# Patient Record
Sex: FEMALE | Race: WHITE | NOT HISPANIC OR LATINO | Employment: FULL TIME | URBAN - METROPOLITAN AREA
[De-identification: names, ages, dates, MRNs, and addresses within clinical notes are randomized per-mention and may not be internally consistent; named-entity substitution may affect disease eponyms.]

---

## 2017-02-10 ENCOUNTER — TELEPHONE (OUTPATIENT)
Dept: INTERNAL MEDICINE | Facility: CLINIC | Age: 42
End: 2017-02-10

## 2017-02-11 NOTE — TELEPHONE ENCOUNTER
----- Message from Navjot Pacheco sent at 2/10/2017  3:41 PM CST -----  Contact: pt  Pt is calling nurse staff regarding is on thyroid medication and pt stated that patient is feeling tired lately. Pt call back [phone] thanks

## 2017-02-13 ENCOUNTER — TELEPHONE (OUTPATIENT)
Dept: INTERNAL MEDICINE | Facility: CLINIC | Age: 42
End: 2017-02-13

## 2017-02-13 NOTE — TELEPHONE ENCOUNTER
----- Message from Alexandra Gu sent at 2/13/2017 12:36 PM CST -----  Pt at [phone]//states she is returning your call from Friday//please call again//thanks/rosa elena

## 2017-02-15 ENCOUNTER — TELEPHONE (OUTPATIENT)
Dept: INTERNAL MEDICINE | Facility: CLINIC | Age: 42
End: 2017-02-15

## 2017-02-15 DIAGNOSIS — E06.3 HASHIMOTO'S THYROIDITIS: ICD-10-CM

## 2017-02-15 DIAGNOSIS — R53.83 FATIGUE, UNSPECIFIED TYPE: ICD-10-CM

## 2017-02-15 DIAGNOSIS — E07.9 THYROID DISORDER: Primary | ICD-10-CM

## 2017-02-15 NOTE — TELEPHONE ENCOUNTER
Patient states that she is on 50mg of thyroid meds and she is tired all the time and fatigue. Patient last seen in aug of 2016 repeated labs and did us results showed hasimotos thyroiditis and you wanted to see her back to review results which she did not f/u. She states that she would like to know if the dose needs to be adjusted. Per last result note you put for her to return in April with lab please advise.

## 2017-02-15 NOTE — TELEPHONE ENCOUNTER
----- Message from Caty Zayas sent at 2/15/2017  2:52 PM CST -----  Would like to speak to nurse. Please call back at [phone]. Thanks///db

## 2017-02-16 NOTE — TELEPHONE ENCOUNTER
I ordered thyroid labs as well as blood counts, vit d and b12 and iron to look into the fatigue as well.

## 2017-03-01 ENCOUNTER — LAB VISIT (OUTPATIENT)
Dept: LAB | Facility: HOSPITAL | Age: 42
End: 2017-03-01
Attending: FAMILY MEDICINE
Payer: COMMERCIAL

## 2017-03-01 DIAGNOSIS — R53.83 FATIGUE, UNSPECIFIED TYPE: ICD-10-CM

## 2017-03-01 DIAGNOSIS — E07.9 THYROID DISORDER: ICD-10-CM

## 2017-03-01 DIAGNOSIS — E06.3 HASHIMOTO'S THYROIDITIS: ICD-10-CM

## 2017-03-01 LAB
25(OH)D3+25(OH)D2 SERPL-MCNC: 37 NG/ML
BASOPHILS # BLD AUTO: 0.04 K/UL
BASOPHILS NFR BLD: 0.9 %
DIFFERENTIAL METHOD: ABNORMAL
EOSINOPHIL # BLD AUTO: 0.1 K/UL
EOSINOPHIL NFR BLD: 2.5 %
ERYTHROCYTE [DISTWIDTH] IN BLOOD BY AUTOMATED COUNT: 14.6 %
HCT VFR BLD AUTO: 34.5 %
HGB BLD-MCNC: 10.5 G/DL
IRON SERPL-MCNC: 32 UG/DL
LYMPHOCYTES # BLD AUTO: 1.2 K/UL
LYMPHOCYTES NFR BLD: 26.3 %
MCH RBC QN AUTO: 25.3 PG
MCHC RBC AUTO-ENTMCNC: 30.4 %
MCV RBC AUTO: 83 FL
MONOCYTES # BLD AUTO: 0.4 K/UL
MONOCYTES NFR BLD: 8.2 %
NEUTROPHILS # BLD AUTO: 2.7 K/UL
NEUTROPHILS NFR BLD: 62.1 %
PLATELET # BLD AUTO: 308 K/UL
PMV BLD AUTO: 10 FL
RBC # BLD AUTO: 4.15 M/UL
SATURATED IRON: 8 %
T4 FREE SERPL-MCNC: 0.88 NG/DL
TOTAL IRON BINDING CAPACITY: 423 UG/DL
TRANSFERRIN SERPL-MCNC: 286 MG/DL
TSH SERPL DL<=0.005 MIU/L-ACNC: 4.7 UIU/ML
VIT B12 SERPL-MCNC: 472 PG/ML
WBC # BLD AUTO: 4.38 K/UL

## 2017-03-01 PROCEDURE — 36415 COLL VENOUS BLD VENIPUNCTURE: CPT | Mod: PO

## 2017-03-01 PROCEDURE — 84443 ASSAY THYROID STIM HORMONE: CPT

## 2017-03-01 PROCEDURE — 85025 COMPLETE CBC W/AUTO DIFF WBC: CPT

## 2017-03-01 PROCEDURE — 82607 VITAMIN B-12: CPT

## 2017-03-01 PROCEDURE — 82306 VITAMIN D 25 HYDROXY: CPT

## 2017-03-01 PROCEDURE — 83540 ASSAY OF IRON: CPT

## 2017-03-01 PROCEDURE — 84439 ASSAY OF FREE THYROXINE: CPT

## 2017-03-06 ENCOUNTER — PATIENT MESSAGE (OUTPATIENT)
Dept: INTERNAL MEDICINE | Facility: CLINIC | Age: 42
End: 2017-03-06

## 2017-03-07 ENCOUNTER — TELEPHONE (OUTPATIENT)
Dept: INTERNAL MEDICINE | Facility: CLINIC | Age: 42
End: 2017-03-07

## 2017-03-07 RX ORDER — LEVOTHYROXINE SODIUM 75 UG/1
75 TABLET ORAL DAILY
Qty: 90 TABLET | Refills: 0 | Status: SHIPPED | OUTPATIENT
Start: 2017-03-07 | End: 2017-06-09 | Stop reason: SDUPTHER

## 2017-03-07 NOTE — TELEPHONE ENCOUNTER
----- Message from Navjot Pacheco sent at 3/6/2017  4:09 PM CST -----  Contact: pt  Pt is calling nurse staff regarding patient lab results. Pt call back to be advise. [phone] to be advise thanks

## 2017-03-07 NOTE — TELEPHONE ENCOUNTER
She needs an increase in thyroid meds, but I thought she was going to have an appt to followup these labs. Please make an appt for next week. Will increase the thyroid from 50 to 75mcg

## 2017-03-09 ENCOUNTER — PATIENT OUTREACH (OUTPATIENT)
Dept: ADMINISTRATIVE | Facility: HOSPITAL | Age: 42
End: 2017-03-09

## 2017-03-20 ENCOUNTER — PATIENT MESSAGE (OUTPATIENT)
Dept: INTERNAL MEDICINE | Facility: CLINIC | Age: 42
End: 2017-03-20

## 2017-03-21 ENCOUNTER — OFFICE VISIT (OUTPATIENT)
Dept: INTERNAL MEDICINE | Facility: CLINIC | Age: 42
End: 2017-03-21
Payer: COMMERCIAL

## 2017-03-21 VITALS
HEART RATE: 80 BPM | SYSTOLIC BLOOD PRESSURE: 100 MMHG | TEMPERATURE: 98 F | HEIGHT: 60 IN | DIASTOLIC BLOOD PRESSURE: 80 MMHG | BODY MASS INDEX: 25.19 KG/M2 | WEIGHT: 128.31 LBS

## 2017-03-21 DIAGNOSIS — K59.00 CONSTIPATION, UNSPECIFIED CONSTIPATION TYPE: ICD-10-CM

## 2017-03-21 DIAGNOSIS — E06.3 HASHIMOTO'S THYROIDITIS: Primary | ICD-10-CM

## 2017-03-21 DIAGNOSIS — R14.0 ABDOMINAL BLOATING: ICD-10-CM

## 2017-03-21 DIAGNOSIS — R00.2 HEART PALPITATIONS: ICD-10-CM

## 2017-03-21 DIAGNOSIS — F41.1 GAD (GENERALIZED ANXIETY DISORDER): ICD-10-CM

## 2017-03-21 DIAGNOSIS — D64.9 ANEMIA, UNSPECIFIED TYPE: ICD-10-CM

## 2017-03-21 PROCEDURE — 1160F RVW MEDS BY RX/DR IN RCRD: CPT | Mod: S$GLB,,, | Performed by: FAMILY MEDICINE

## 2017-03-21 PROCEDURE — 93005 ELECTROCARDIOGRAM TRACING: CPT | Mod: S$GLB,,, | Performed by: FAMILY MEDICINE

## 2017-03-21 PROCEDURE — 99214 OFFICE O/P EST MOD 30 MIN: CPT | Mod: S$GLB,,, | Performed by: FAMILY MEDICINE

## 2017-03-21 PROCEDURE — 93010 ELECTROCARDIOGRAM REPORT: CPT | Mod: S$GLB,,, | Performed by: INTERNAL MEDICINE

## 2017-03-21 PROCEDURE — 99999 PR PBB SHADOW E&M-EST. PATIENT-LVL III: CPT | Mod: PBBFAC,,, | Performed by: FAMILY MEDICINE

## 2017-03-21 RX ORDER — SERTRALINE HYDROCHLORIDE 50 MG/1
50 TABLET, FILM COATED ORAL DAILY
Qty: 30 TABLET | Refills: 11 | Status: SHIPPED | OUTPATIENT
Start: 2017-03-21 | End: 2018-03-21

## 2017-03-21 NOTE — MR AVS SNAPSHOT
[facility]  [address] [place] [address]  Phone: [phone]  Fax: [phone]                  Sandy Alfonso   3/21/2017 8:40 AM   Office Visit    Description:  Female : 1975   Provider:  Jolie Almodovar MD   [facility]           Reason for Visit     Follow-up     Constipation           Diagnoses this Visit        Comments    Hashimoto's thyroiditis    -  Primary     Constipation, unspecified constipation type         Abdominal bloating         Anemia, unspecified type         KAMAR (generalized anxiety disorder)     worse lately with higher heart rate while working out. also with IBS constipatin. symptoms. start sertraline.     Heart palpitations                To Do List           Future Appointments        Provider Department Dept Phone    2017 9:10 AM LABORATORY, [place] [facility] [phone]    2017 11:00 AM Jolie Almodovar MD [facility] [phone]      Goals (5 Years of Data)     None      Follow-Up and Disposition     Return in about 5 weeks (around 2017) for f/u sertraline, thyroid, bowels, labs.       These Medications        Disp Refills Start End    sertraline (ZOLOFT) 50 MG tablet 30 tablet 11 3/21/2017 3/21/2018    Take 1 tablet (50 mg total) by mouth once daily. - Oral    Pharmacy: [facility] [address] [place], [place] [id number] [facility] [address] AT [facility] [place] [address] [place] [address] Ph #: [phone]         [facility]     [facility] On [facility] Nurse Care Line -  Assistance  Registered nurses in the [facility] Center provide clinical advisement, health education, appointment booking, and other advisory services.  Call for this free service at [phone].             Medications           Message regarding Medications     Verify the changes and/or additions to your medication regime listed below are the same as discussed with your clinician  today.  If any of these changes or additions are incorrect, please notify your healthcare provider.        START taking these NEW medications        Refills    sertraline (ZOLOFT) 50 MG tablet 11    Sig: Take 1 tablet (50 mg total) by mouth once daily.    Class: Normal    Route: Oral      STOP taking these medications     lubiprostone (AMITIZA) 8 MCG Cap Take 1 capsule (8 mcg total) by mouth 2 (two) times daily with meals.           Verify that the below list of medications is an accurate representation of the medications you are currently taking.  If none reported, the list may be blank. If incorrect, please contact your healthcare provider. Carry this list with you in case of emergency.           Current Medications     levothyroxine (SYNTHROID) 75 MCG tablet Take 1 tablet (75 mcg total) by mouth once daily.    sertraline (ZOLOFT) 50 MG tablet Take 1 tablet (50 mg total) by mouth once daily.    valacyclovir (VALTREX) 1000 MG tablet TK 2 TS PO TONIGHT AND 2 TS PO TOMORROW MORNING           Clinical Reference Information           Your Vitals Were     BP Pulse Temp Height Weight Last Period    100/80 80 98 °F (36.7 °C) 5' (1.524 m) 58.2 kg (128 lb 4.9 oz) 02/01/2017    BMI                25.06 kg/m2          Blood Pressure          Most Recent Value    BP  100/80      Allergies as of 3/21/2017     No Known Allergies      Immunizations Administered on Date of Encounter - 3/21/2017     None      Orders Placed During Today's Visit      Normal Orders This Visit    EKG 12-lead     Future Labs/Procedures Expected by Expires    CBC auto differential  3/21/2017 5/20/2018    Comprehensive metabolic panel  3/21/2017 5/20/2018    Lipid panel  3/21/2017 5/20/2018    T4, free  3/21/2017 5/20/2018    TSH  3/21/2017 5/20/2018      Language Assistance Services     ATTENTION: Language assistance services are available, free of charge. Please call [phone].      ATENCIÓN: Si iván dhaliwal, tiene a parra disposición servicios  deniceos de asistencia lingüística. Hung diamond [phone].     LEROY Ý: N?u b?n nói Ti?ng Vi?t, có các d?ch v? h? tr? ngôn ng? mi?n phí dành cho b?n. G?i s? [phone].         [facility] complies with applicable Federal civil rights laws and does not discriminate on the basis of race, color, national origin, age, disability, or sex.

## 2017-03-25 NOTE — PROGRESS NOTES
Subjective:      Patient ID: Sandy Alfonso is a 41 y.o. female.    Chief Complaint: Follow-up (thyroid); Constipation; and Thyroid Problem    HPI Comments: Disclaimer:  This note is prepared using voice recognition software and as such is likely to have errors and has not been proof read. Please contact me for questions.   Patient was diagnosed with Hashimoto's thyroiditis.  She's currently on levothyroxin at 75 migraines now.  I just recently increased her dose because she's having a lot of symptoms of constipation and weight gain and her most recent cell labs was still under supplemented.      She's also been having a lot of GI issues which a been chronic for her.  She suffer from chronic constipation for a while.  She did see the GI specialist Dr. House.  I had previous he put her on high-dose amikacin but it caused her have too much diarrhea.  He placed her on a lower dose however didn't really help either.  She still reports a lot of bloatedness.  After eating it's a lot worse.  She has a lot of belching and gas.  Sometimes she feels like she has a sour stomach.  She's tried Metamucil and fiber but it's and still has issues.  With amide teeth that she had pain with dizziness as well and headaches.  They've milligrams to know better.  Her bowel movements only once a week.  She also reports a lot of fatigue.  We also discussed that patients with Hashimoto's may also be more sensitive to having celiac disease.  She states per dominant her diet is gluten-free.    She also reports that she was trying to exercise more however recently her anxiety is gotten much worse.  She reports immediately as soon as her heart rate starts getting a little bit faster she starts getting worried that something is wrong.  Wether is a palpitation or chest pain she gets too nervous and that she will stop.  She's interested into looking into this further.    Constipation   This is a chronic problem. The current episode started  more than 1 year ago. The problem is unchanged. Her stool frequency is 1 time per week or less. The stool is described as firm. The patient is not on a high fiber diet. She does not exercise regularly. There has been adequate water intake. Associated symptoms include abdominal pain and bloating. Pertinent negatives include no back pain, diarrhea, difficulty urinating, fecal incontinence, fever, nausea, rectal pain, vomiting or weight loss. Risk factors include stress. She has tried diet changes, fiber, stool softeners and laxatives (amitiza) for the symptoms. The treatment provided no relief. Her past medical history is significant for irritable bowel syndrome.   Thyroid Problem   Presents for follow-up visit. Symptoms include anxiety, constipation, fatigue, palpitations and weight gain. Patient reports no cold intolerance, depressed mood, diaphoresis, diarrhea, dry skin, heat intolerance, hoarse voice, leg swelling, visual change or weight loss. The symptoms have been improving.       Lab Results   Component Value Date    WBC 4.38 03/01/2017    HGB 10.5 (L) 03/01/2017    HCT 34.5 (L) 03/01/2017     03/01/2017    CHOL 120 08/17/2016    TRIG 38 08/17/2016    HDL 44 08/17/2016    ALT 14 08/17/2016    AST 20 08/17/2016     08/17/2016    K 3.5 08/17/2016     08/17/2016    CREATININE 0.7 08/17/2016    BUN 12 08/17/2016    CO2 25 08/17/2016    TSH 4.697 (H) 03/01/2017       Review of Systems   Constitutional: Positive for activity change, fatigue, unexpected weight change and weight gain. Negative for appetite change, diaphoresis, fever and weight loss.   HENT: Negative for hoarse voice.    Respiratory: Negative for cough and shortness of breath.    Cardiovascular: Positive for palpitations. Negative for chest pain and leg swelling.   Gastrointestinal: Positive for abdominal distention, abdominal pain, bloating and constipation. Negative for anal bleeding, blood in stool, diarrhea, nausea, rectal pain  and vomiting.   Endocrine: Negative for cold intolerance and heat intolerance.   Genitourinary: Negative for difficulty urinating.   Musculoskeletal: Negative for back pain.   Neurological: Negative for weakness, light-headedness and headaches.   Psychiatric/Behavioral: Positive for decreased concentration and dysphoric mood. Negative for sleep disturbance. The patient is nervous/anxious.      Objective:     Physical Exam   Constitutional: She is oriented to person, place, and time. She appears well-developed and well-nourished.   HENT:   Head: Normocephalic and atraumatic.   Right Ear: External ear normal.   Left Ear: External ear normal.   Mouth/Throat: Oropharynx is clear and moist.   Eyes: EOM are normal.   Neck: Normal range of motion. Neck supple. No thyromegaly present.   Cardiovascular: Normal rate and regular rhythm.  Exam reveals no gallop and no friction rub.    No murmur heard.  Pulmonary/Chest: Effort normal. No respiratory distress. She has no wheezes. She has no rales.   Abdominal: Soft. Bowel sounds are normal. She exhibits no distension. There is no tenderness. There is no rebound.   Musculoskeletal: Normal range of motion. She exhibits no edema.   Lymphadenopathy:     She has no cervical adenopathy.   Neurological: She is alert and oriented to person, place, and time.   Skin: Skin is warm and dry. No rash noted.   Psychiatric: She has a normal mood and affect. Her behavior is normal. Judgment and thought content normal.   Vitals reviewed.    Assessment:     1. Hashimoto's thyroiditis    2. Constipation, unspecified constipation type    3. Abdominal bloating    4. Anemia, unspecified type    5. KAMAR (generalized anxiety disorder)    6. Heart palpitations      Plan:   Sandy was seen today for follow-up, constipation and thyroid problem.    Diagnoses and all orders for this visit:    Hashimoto's thyroiditis- not controlled, recently did labs and now increased dose.  We'll repeat labs again in 6  weeks.  -     TSH; Future  -     T4, free; Future  -     Lipid panel; Future  -     Comprehensive metabolic panel; Future  -     CBC auto differential; Future    Constipation, unspecified constipation type-     sertraline (ZOLOFT) 50 MG tablet; Take 1 tablet (50 mg total) by mouth once daily.  -     TSH; Future  -     T4, free; Future  -     Lipid panel; Future  -     Comprehensive metabolic panel; Future  -     CBC auto differential; Future    Abdominal bloating-worse suspected component of IBS symptoms as well.  Will start sertraline at 50 mg.  In 6 weeks' time if not improving would recommend possible additional GI referral for colonoscopy.  She spelled multiple over-the-counter treatments as well as amitiza.     -     TSH; Future  -     T4, free; Future  -     Lipid panel; Future  -     Comprehensive metabolic panel; Future  -     CBC auto differential; Future    Anemia, unspecified type- noted on last set of labs, at high risk for celiac disease.  At this time holding off on any testing but patient will try to consume more of a gluten-free diet.  -     TSH; Future  -     T4, free; Future  -     Lipid panel; Future  -     Comprehensive metabolic panel; Future  -     CBC auto differential; Future    KAMAR (generalized anxiety disorder)-new  Comments:  worse lately with higher heart rate while working out. also with IBS constipatin. symptoms. start sertraline.   Orders:  -     sertraline (ZOLOFT) 50 MG tablet; Take 1 tablet (50 mg total) by mouth once daily.  -     TSH; Future  -     T4, free; Future  -     Lipid panel; Future  -     Comprehensive metabolic panel; Future  -     CBC auto differential; Future    Heart palpitations-new, EKG today was noted to be normal sinus rhythm without significant abnormalities.  Schedule labs prior to next visit.  Starting sertraline.  -     TSH; Future  -     T4, free; Future  -     Lipid panel; Future  -     Comprehensive metabolic panel; Future  -     CBC auto differential;  Future  -     EKG 12-lead            Return in about 5 weeks (around 4/25/2017) for f/u sertraline, thyroid, bowels, labs.

## 2017-04-07 ENCOUNTER — PATIENT MESSAGE (OUTPATIENT)
Dept: INTERNAL MEDICINE | Facility: CLINIC | Age: 42
End: 2017-04-07

## 2017-04-07 DIAGNOSIS — K58.1 IRRITABLE BOWEL SYNDROME WITH CONSTIPATION: Primary | ICD-10-CM

## 2017-05-24 ENCOUNTER — TELEPHONE (OUTPATIENT)
Dept: INTERNAL MEDICINE | Facility: CLINIC | Age: 42
End: 2017-05-24

## 2017-05-24 ENCOUNTER — PATIENT MESSAGE (OUTPATIENT)
Dept: INTERNAL MEDICINE | Facility: CLINIC | Age: 42
End: 2017-05-24

## 2017-05-24 ENCOUNTER — INITIAL CONSULT (OUTPATIENT)
Dept: GASTROENTEROLOGY | Facility: CLINIC | Age: 42
End: 2017-05-24
Payer: COMMERCIAL

## 2017-05-24 VITALS
SYSTOLIC BLOOD PRESSURE: 90 MMHG | HEART RATE: 92 BPM | BODY MASS INDEX: 24.71 KG/M2 | DIASTOLIC BLOOD PRESSURE: 60 MMHG | HEIGHT: 60 IN | WEIGHT: 125.88 LBS

## 2017-05-24 DIAGNOSIS — R19.5 CHANGE IN STOOL CALIBER: ICD-10-CM

## 2017-05-24 DIAGNOSIS — R14.0 BLOATING: ICD-10-CM

## 2017-05-24 DIAGNOSIS — R68.81 EARLY SATIETY: ICD-10-CM

## 2017-05-24 DIAGNOSIS — R11.0 NAUSEA: ICD-10-CM

## 2017-05-24 DIAGNOSIS — K59.09 CHRONIC CONSTIPATION: ICD-10-CM

## 2017-05-24 DIAGNOSIS — R14.2 BELCHING: ICD-10-CM

## 2017-05-24 DIAGNOSIS — M62.89 PELVIC FLOOR DYSFUNCTION: ICD-10-CM

## 2017-05-24 DIAGNOSIS — K59.09 CONSTIPATION, CHRONIC: Primary | ICD-10-CM

## 2017-05-24 DIAGNOSIS — E03.9 ACQUIRED HYPOTHYROIDISM: ICD-10-CM

## 2017-05-24 PROCEDURE — 99214 OFFICE O/P EST MOD 30 MIN: CPT | Mod: S$GLB,,, | Performed by: INTERNAL MEDICINE

## 2017-05-24 PROCEDURE — 99999 PR PBB SHADOW E&M-EST. PATIENT-LVL III: CPT | Mod: PBBFAC,,, | Performed by: INTERNAL MEDICINE

## 2017-05-24 PROCEDURE — 1160F RVW MEDS BY RX/DR IN RCRD: CPT | Mod: S$GLB,,, | Performed by: INTERNAL MEDICINE

## 2017-05-24 RX ORDER — VALACYCLOVIR HYDROCHLORIDE 1 G/1
TABLET, FILM COATED ORAL
Qty: 20 TABLET | Refills: 1 | Status: SHIPPED | OUTPATIENT
Start: 2017-05-24

## 2017-05-24 RX ORDER — SODIUM, POTASSIUM,MAG SULFATES 17.5-3.13G
1 SOLUTION, RECONSTITUTED, ORAL ORAL ONCE
Qty: 1 BOTTLE | Refills: 0 | Status: SHIPPED | OUTPATIENT
Start: 2017-05-24 | End: 2017-05-24

## 2017-05-24 NOTE — PROGRESS NOTES
Clinic Consult:  [facility] Consultation Note    Reason for Consult:  The primary encounter diagnosis was Constipation, chronic. Diagnoses of Pelvic floor dysfunction, Belching, Bloating, Nausea, Early satiety, and Change in stool caliber were also pertinent to this visit.    PCP: Jolie Almodovar   [address] [address] [place] [address]    HPI:  This is a 41 y.o. female here for evaluation of the above issues.  She is here today because of issues with constipation.  She was started on Amitiza several months ago by her primary care physician.  This didn't seem to provide much improvement so the dose was increased by Dr. House.  The higher dose caused significant chest pain issues so the dose was adjusted so that she was taking 16 µg twice daily.  In spite of taking this she still had a hard time passing bowel movements.  The stools are soft but she still has to strain significantly to have bowel movements.  The symptoms started about a year ago.  Prior to that she had completely normal bowel movements with no significant straining issues needed.  She denies starting any new medications or changes in her lifestyle that may have exacerbated these problems.  She has a child by  20 years ago.  She denies any history of vaginal deliveries.  She denies any weight loss, blood in the stools, family history of colon cancers or colon polyps.  She reports that she goes about 4-5 days between bowel movements.  When she does go she reports that it often times loose or soft stools that are passed.  She has frequent issues with gas pains and abdominal cramping.  She also has frequent bloating and nausea.  The bloating happens with very minimal food intake.  She also reports some early satiety as well as belching.  She has noticed that over the last few months the stool caliber has decreased in size significantly.  She tried fiber supplements in the past as well and these didn't provide much  improvement.  She has a constant sensation that she needs to have a bowel movement but even when this sensation is present she is unable to go.  She has tried manual stimulation and this helped some.    ROS:  CONSTITUTIONAL: Denies weight change,  fatigue, fevers, chills, night sweats.  EYES: No changes in vision.   ENT: No oral lesions or sore throat.  HEMATOLOGICAL/Lymph: Denies bleeding tendency, bruising tendency. No swellings or enlarged lymph nodes.  CARDIOVASCULAR: Denies chest pain, shortness of breath, orthopnea and edema.  RESPIRATORY: Denies cough, hemoptysis, dyspnea, and wheezing.  GI: See HPI.  : Denies dysuria and hematuria  MUSCULOSKELETAL: Denies joint pain or swelling, back pain and muscle pain.  SKIN: Denies rashes.  NEUROLOGIC: Denies headaches, seizures and numbness.  PSYCHIATRIC: Denies depression or anxiety.  ENDOCRINE: Denies heat or cold intolerance and excessive thirst or urination.    Medical History:   Past Medical History:   Diagnosis Date    Chronic constipation     Thyroid disorder        Surgical History:  Past Surgical History:   Procedure Laterality Date    APPENDECTOMY       SECTION         Family History:   Family History   Problem Relation Age of Onset    Heart defect Father     Cancer Maternal Aunt     Colon cancer Maternal Grandmother        Social History:   Social History   Substance Use Topics    Smoking status: Never Smoker    Smokeless tobacco: Not on file    Alcohol use No       Allergies: Reviewed    Home Medications:   Medication List with Changes/Refills   New Medications    LINACLOTIDE (LINZESS) 145 MCG CAP CAPSULE    Take 1 capsule (145 mcg total) by mouth once daily.    SODIUM,POTASSIUM,MAG SULFATES (SUPREP BOWEL PREP KIT) 17.5-3.13-1.6 GRAM SOLR    Take 1 kit by mouth once.   Current Medications    LEVOTHYROXINE (SYNTHROID) 75 MCG TABLET    Take 1 tablet (75 mcg total) by mouth once daily.    SERTRALINE (ZOLOFT) 50 MG TABLET    Take 1 tablet (50  mg total) by mouth once daily.    VALACYCLOVIR (VALTREX) 1000 MG TABLET    TK 2 TS PO TONIGHT AND 2 TS PO TOMORROW MORNING         Physical Exam:  Vital Signs:  BP 90/60   Pulse 92   Ht 5' (1.524 m)   Wt 57.1 kg (125 lb 14.1 oz)   BMI 24.58 kg/m²   Body mass index is 24.58 kg/m².      GENERAL: No acute distress, A&Ox4  EYES: Anicteric, no pallor noted.  ENT: OP clear  NECK: Supple, no masses, no thyromegally.  CHEST: Equal breath sounds bilaterally, no wheezing.  CARDIOVASCULAR: Regular rate and rhythm. Murmurs, rubs and gallops absent.  ABDOMEN: soft, non-tender, non-distended, normal bowel sounds, no hepatosplenomegaly   EXTREMITIES: No clubbing, cyanosis or edema.  SKIN: Without lesion.  LYMPH: No cervical, axillary lymphadenopathy palpable.   NEUROLOGICAL: Grossly normal.  RECTAL: Small skin tag on external exam.  Normal digital rectal exam.  Minimal change in the internal anal sphincter tone with squeeze or bearing down.  Puborectalis muscle seems to contract paradoxically with bearing down.    Labs: Pertinent labs reviewed.  Mild anemia with iron studies consistent with iron deficiency.  Normal CMP.  Normal TSH.    Endoscopy:  Never done.    CRC Screening: Not applicable.    Assessment:  1. Constipation, chronic    2. Pelvic floor dysfunction    3. Belching    4. Bloating    5. Nausea    6. Early satiety    7. Change in stool caliber         Recommendations:  1.  Constipation: I'm concerned that she probably has pelvic floor dysfunction.  The inability to pass stools without straining and need for manual stimulation in spite of having soft stools suggest this.  Physical exam is also concerning for issues with the puborectalis muscle and the internal anal sphincter.  I recommend that we try Linzess to see if this helps some.  We will arrange for a colonoscopy in the near future given the change in stool caliber and ongoing symptoms.  If the colonoscopy is unrevealing she would likely benefit from  biofeedback therapy.    2.  Belching/nausea/early satiety: Likely related to constipation.  We will do an upper endoscopy the time of her colonoscopy.    3.  Iron deficiency anemia: Likely from heavy menses but possibly a GI source given her GI symptoms.    Follow up based on the above evaluation.    Order summary:  No orders of the defined types were placed in this encounter.      Thank you so much for allowing me to participate in the care of Sandy Zaragoza MD

## 2017-05-24 NOTE — LETTER
May 24, 2017      Jolie Almodovar MD  [address] [facility]  [address]  [place] [address]           [facility]  [address]  [place] [address]  Phone: [phone]  Fax: [phone]          Patient: Sandy Alfonso   MR Number: [id number]   YOB: 1975   Date of Visit: 5/24/2017       Dear Dr. Jolie Almodovar:    Thank you for referring Sandy Alfonso to me for evaluation. Attached you will find relevant portions of my assessment and plan of care.    If you have questions, please do not hesitate to call me. I look forward to following Sandy Alfonso along with you.    Sincerely,    Riaz Zaragoza MD    Enclosure  CC:  No Recipients    If you would like to receive this communication electronically, please contact [email or web address] or [phone] to request more information on [facility] [employer] access.    For providers and/or their staff who would like to refer a patient to [facility], please contact us through our one-stop-shop provider referral line, [facility], at [phone].    If you feel you have received this communication in error or would no longer like to receive these types of communications, please e-mail [email or web address]

## 2017-06-01 ENCOUNTER — SURGERY (OUTPATIENT)
Age: 42
End: 2017-06-01

## 2017-06-01 ENCOUNTER — ANESTHESIA (OUTPATIENT)
Dept: ENDOSCOPY | Facility: HOSPITAL | Age: 42
End: 2017-06-01
Payer: COMMERCIAL

## 2017-06-01 ENCOUNTER — ANESTHESIA EVENT (OUTPATIENT)
Dept: ENDOSCOPY | Facility: HOSPITAL | Age: 42
End: 2017-06-01
Payer: COMMERCIAL

## 2017-06-01 ENCOUNTER — HOSPITAL ENCOUNTER (OUTPATIENT)
Facility: HOSPITAL | Age: 42
Discharge: HOME | End: 2017-06-01
Attending: INTERNAL MEDICINE | Admitting: INTERNAL MEDICINE
Payer: COMMERCIAL

## 2017-06-01 VITALS
SYSTOLIC BLOOD PRESSURE: 130 MMHG | RESPIRATION RATE: 18 BRPM | OXYGEN SATURATION: 100 % | TEMPERATURE: 97 F | HEART RATE: 86 BPM | DIASTOLIC BLOOD PRESSURE: 75 MMHG

## 2017-06-01 DIAGNOSIS — R68.81 EARLY SATIETY: ICD-10-CM

## 2017-06-01 LAB
B-HCG UR QL: NEGATIVE
CTP QC/QA: YES

## 2017-06-01 PROCEDURE — 43239 EGD BIOPSY SINGLE/MULTIPLE: CPT | Mod: 51,,, | Performed by: INTERNAL MEDICINE

## 2017-06-01 PROCEDURE — 37000009 HC ANESTHESIA EA ADD 15 MINS: Performed by: INTERNAL MEDICINE

## 2017-06-01 PROCEDURE — 37000008 HC ANESTHESIA 1ST 15 MINUTES: Performed by: INTERNAL MEDICINE

## 2017-06-01 PROCEDURE — 43239 EGD BIOPSY SINGLE/MULTIPLE: CPT | Performed by: INTERNAL MEDICINE

## 2017-06-01 PROCEDURE — 81025 URINE PREGNANCY TEST: CPT | Performed by: INTERNAL MEDICINE

## 2017-06-01 PROCEDURE — 63600175 PHARM REV CODE 636 W HCPCS: Performed by: NURSE ANESTHETIST, CERTIFIED REGISTERED

## 2017-06-01 PROCEDURE — 45380 COLONOSCOPY AND BIOPSY: CPT | Mod: ,,, | Performed by: INTERNAL MEDICINE

## 2017-06-01 PROCEDURE — 88305 TISSUE EXAM BY PATHOLOGIST: CPT | Mod: 26,,, | Performed by: PATHOLOGY

## 2017-06-01 PROCEDURE — 88305 TISSUE EXAM BY PATHOLOGIST: CPT | Performed by: PATHOLOGY

## 2017-06-01 PROCEDURE — 25000003 PHARM REV CODE 250: Performed by: NURSE ANESTHETIST, CERTIFIED REGISTERED

## 2017-06-01 PROCEDURE — 45380 COLONOSCOPY AND BIOPSY: CPT | Performed by: INTERNAL MEDICINE

## 2017-06-01 PROCEDURE — 25000003 PHARM REV CODE 250: Performed by: INTERNAL MEDICINE

## 2017-06-01 PROCEDURE — 27201012 HC FORCEPS, HOT/COLD, DISP: Performed by: INTERNAL MEDICINE

## 2017-06-01 RX ORDER — PROPOFOL 10 MG/ML
VIAL (ML) INTRAVENOUS
Status: DISCONTINUED | OUTPATIENT
Start: 2017-06-01 | End: 2017-06-01

## 2017-06-01 RX ORDER — SODIUM CHLORIDE, SODIUM LACTATE, POTASSIUM CHLORIDE, CALCIUM CHLORIDE 600; 310; 30; 20 MG/100ML; MG/100ML; MG/100ML; MG/100ML
INJECTION, SOLUTION INTRAVENOUS CONTINUOUS
Status: DISCONTINUED | OUTPATIENT
Start: 2017-06-01 | End: 2017-06-01 | Stop reason: HOSPADM

## 2017-06-01 RX ORDER — LIDOCAINE HYDROCHLORIDE 20 MG/ML
INJECTION, SOLUTION EPIDURAL; INFILTRATION; INTRACAUDAL; PERINEURAL
Status: DISCONTINUED | OUTPATIENT
Start: 2017-06-01 | End: 2017-06-01

## 2017-06-01 RX ADMIN — PROPOFOL 50 MG: 10 INJECTION, EMULSION INTRAVENOUS at 11:06

## 2017-06-01 RX ADMIN — SODIUM CHLORIDE, SODIUM LACTATE, POTASSIUM CHLORIDE, AND CALCIUM CHLORIDE: 600; 310; 30; 20 INJECTION, SOLUTION INTRAVENOUS at 10:06

## 2017-06-01 RX ADMIN — PROPOFOL 150 MG: 10 INJECTION, EMULSION INTRAVENOUS at 10:06

## 2017-06-01 RX ADMIN — LIDOCAINE HYDROCHLORIDE 100 MG: 20 INJECTION, SOLUTION EPIDURAL; INFILTRATION; INTRACAUDAL; PERINEURAL at 10:06

## 2017-06-01 RX ADMIN — PROPOFOL 50 MG: 10 INJECTION, EMULSION INTRAVENOUS at 10:06

## 2017-06-01 NOTE — H&P (VIEW-ONLY)
Clinic Consult:  [facility] Consultation Note    Reason for Consult:  The primary encounter diagnosis was Constipation, chronic. Diagnoses of Pelvic floor dysfunction, Belching, Bloating, Nausea, Early satiety, and Change in stool caliber were also pertinent to this visit.    PCP: Jolie Almodovar   [address] [facility] [address] / [place] [id number]    HPI:  This is a 41 y.o. female here for evaluation of the above issues.  She is here today because of issues with constipation.  She was started on Amitiza several months ago by her primary care physician.  This didn't seem to provide much improvement so the dose was increased by Dr. House.  The higher dose caused significant chest pain issues so the dose was adjusted so that she was taking 16 µg twice daily.  In spite of taking this she still had a hard time passing bowel movements.  The stools are soft but she still has to strain significantly to have bowel movements.  The symptoms started about a year ago.  Prior to that she had completely normal bowel movements with no significant straining issues needed.  She denies starting any new medications or changes in her lifestyle that may have exacerbated these problems.  She has a child by  20 years ago.  She denies any history of vaginal deliveries.  She denies any weight loss, blood in the stools, family history of colon cancers or colon polyps.  She reports that she goes about 4-5 days between bowel movements.  When she does go she reports that it often times loose or soft stools that are passed.  She has frequent issues with gas pains and abdominal cramping.  She also has frequent bloating and nausea.  The bloating happens with very minimal food intake.  She also reports some early satiety as well as belching.  She has noticed that over the last few months the stool caliber has decreased in size significantly.  She tried fiber supplements in the past as well and these didn't provide much  improvement.  She has a constant sensation that she needs to have a bowel movement but even when this sensation is present she is unable to go.  She has tried manual stimulation and this helped some.    ROS:  CONSTITUTIONAL: Denies weight change,  fatigue, fevers, chills, night sweats.  EYES: No changes in vision.   ENT: No oral lesions or sore throat.  HEMATOLOGICAL/Lymph: Denies bleeding tendency, bruising tendency. No swellings or enlarged lymph nodes.  CARDIOVASCULAR: Denies chest pain, shortness of breath, orthopnea and edema.  RESPIRATORY: Denies cough, hemoptysis, dyspnea, and wheezing.  GI: See HPI.  : Denies dysuria and hematuria  MUSCULOSKELETAL: Denies joint pain or swelling, back pain and muscle pain.  SKIN: Denies rashes.  NEUROLOGIC: Denies headaches, seizures and numbness.  PSYCHIATRIC: Denies depression or anxiety.  ENDOCRINE: Denies heat or cold intolerance and excessive thirst or urination.    Medical History:   Past Medical History:   Diagnosis Date    Chronic constipation     Thyroid disorder        Surgical History:  Past Surgical History:   Procedure Laterality Date    APPENDECTOMY       SECTION         Family History:   Family History   Problem Relation Age of Onset    Heart defect Father     Cancer Maternal Aunt     Colon cancer Maternal Grandmother        Social History:   Social History   Substance Use Topics    Smoking status: Never Smoker    Smokeless tobacco: Not on file    Alcohol use No       Allergies: Reviewed    Home Medications:   Medication List with Changes/Refills   New Medications    LINACLOTIDE (LINZESS) 145 MCG CAP CAPSULE    Take 1 capsule (145 mcg total) by mouth once daily.    SODIUM,POTASSIUM,MAG SULFATES (SUPREP BOWEL PREP KIT) 17.5-3.13-1.6 GRAM SOLR    Take 1 kit by mouth once.   Current Medications    LEVOTHYROXINE (SYNTHROID) 75 MCG TABLET    Take 1 tablet (75 mcg total) by mouth once daily.    SERTRALINE (ZOLOFT) 50 MG TABLET    Take 1 tablet (50  mg total) by mouth once daily.    VALACYCLOVIR (VALTREX) 1000 MG TABLET    TK 2 TS PO TONIGHT AND 2 TS PO TOMORROW MORNING         Physical Exam:  Vital Signs:  BP 90/60   Pulse 92   Ht 5' (1.524 m)   Wt 57.1 kg (125 lb 14.1 oz)   BMI 24.58 kg/m²   Body mass index is 24.58 kg/m².      GENERAL: No acute distress, A&Ox4  EYES: Anicteric, no pallor noted.  ENT: OP clear  NECK: Supple, no masses, no thyromegally.  CHEST: Equal breath sounds bilaterally, no wheezing.  CARDIOVASCULAR: Regular rate and rhythm. Murmurs, rubs and gallops absent.  ABDOMEN: soft, non-tender, non-distended, normal bowel sounds, no hepatosplenomegaly   EXTREMITIES: No clubbing, cyanosis or edema.  SKIN: Without lesion.  LYMPH: No cervical, axillary lymphadenopathy palpable.   NEUROLOGICAL: Grossly normal.  RECTAL: Small skin tag on external exam.  Normal digital rectal exam.  Minimal change in the internal anal sphincter tone with squeeze or bearing down.  Puborectalis muscle seems to contract paradoxically with bearing down.    Labs: Pertinent labs reviewed.  Mild anemia with iron studies consistent with iron deficiency.  Normal CMP.  Normal TSH.    Endoscopy:  Never done.    CRC Screening: Not applicable.    Assessment:  1. Constipation, chronic    2. Pelvic floor dysfunction    3. Belching    4. Bloating    5. Nausea    6. Early satiety    7. Change in stool caliber         Recommendations:  1.  Constipation: I'm concerned that she probably has pelvic floor dysfunction.  The inability to pass stools without straining and need for manual stimulation in spite of having soft stools suggest this.  Physical exam is also concerning for issues with the puborectalis muscle and the internal anal sphincter.  I recommend that we try Linzess to see if this helps some.  We will arrange for a colonoscopy in the near future given the change in stool caliber and ongoing symptoms.  If the colonoscopy is unrevealing she would likely benefit from  biofeedback therapy.    2.  Belching/nausea/early satiety: Likely related to constipation.  We will do an upper endoscopy the time of her colonoscopy.    3.  Iron deficiency anemia: Likely from heavy menses but possibly a GI source given her GI symptoms.    Follow up based on the above evaluation.    Order summary:  No orders of the defined types were placed in this encounter.      Thank you so much for allowing me to participate in the care of Sandy Zaragoza MD

## 2017-06-01 NOTE — DISCHARGE SUMMARY
[facility]  Brief Operative Note     SUMMARY     Surgery Date: 6/1/2017     Surgeon(s) and Role:     * Riaz Zaragoza MD - Primary    Assisting Surgeon: None    Pre-op Diagnosis:  Early satiety [R68.81]  Belching [R14.2]  Nausea [R11.0]  Bloating [R14.0]  Constipation, chronic [K59.09]  Change in stool caliber [R19.4]    Post-op Diagnosis:  Post-Op Diagnosis Codes:     * Early satiety [R68.81]     * Belching [R14.2]     * Nausea [R11.0]     * Bloating [R14.0]     * Constipation, chronic [K59.09]     * Change in stool caliber [R19.4]    Procedure(s) (LRB):  ESOPHAGOGASTRODUODENOSCOPY (EGD) (N/A)  COLONOSCOPY (N/A)    Anesthesia: Monitor Anesthesia Care    Description of the findings of the procedure: Procedure completed. See Procedure note for details.     Findings/Key Components: Procedure completed. See Procedure note for details.     Prosthesis/Implants: None    Estimated Blood Loss: less than 10         Specimens:   Specimen (12h ago through future)    Start     Ordered    06/01/17 1058  Specimen to Pathology - Surgery  Once     Comments:  1. Duodenal biopsies R/O Celiac2. Gastric biopsies for gastitis R/O H Pylori3. Esophagus biopsies for irregular Z line at 37 cm  R/O Ham's4. Colon polyps      06/01/17 1116          Discharge Note    SUMMARY     Admit Date: 6/1/2017    Discharge Date and Time:  06/01/2017 11:21 AM    Hospital Course (synopsis of major diagnoses, care, treatment, and services provided during the course of the hospital stay): Procedure completed. See Procedure note for details.      Final Diagnosis: Post-Op Diagnosis Codes:     * Early satiety [R68.81]     * Belching [R14.2]     * Nausea [R11.0]     * Bloating [R14.0]     * Constipation, chronic [K59.09]     * Change in stool caliber [R19.4]    Disposition: Home or Self Care    Follow Up/Patient Instructions:     Medications:  Reconciled Home Medications:   Current Discharge Medication List      CONTINUE these  medications which have NOT CHANGED    Details   levothyroxine (SYNTHROID) 75 MCG tablet Take 1 tablet (75 mcg total) by mouth once daily.  Qty: 90 tablet, Refills: 0      sertraline (ZOLOFT) 50 MG tablet Take 1 tablet (50 mg total) by mouth once daily.  Qty: 30 tablet, Refills: 11    Associated Diagnoses: Constipation, unspecified constipation type; KAMAR (generalized anxiety disorder)      valacyclovir (VALTREX) 1000 MG tablet TK 2 TS PO TONIGHT AND 2 TS PO TOMORROW MORNING  Qty: 20 tablet, Refills: 1    Associated Diagnoses: Chronic constipation; Acquired hypothyroidism      linaclotide (LINZESS) 145 mcg Cap capsule Take 1 capsule (145 mcg total) by mouth once daily.  Qty: 30 capsule, Refills: 5             Discharge Procedure Orders  Diet general     Activity as tolerated       Follow-up Information     Jolie Almodovar MD.    Specialty:  Family Medicine  Contact information:  [id number] [facility]  [address]  [facility] [address] [phone]

## 2017-06-01 NOTE — ANESTHESIA RELEASE NOTE
Anesthesia Release from PACU Note    Patient: Sandy Alfonso    Procedure(s) Performed: Procedure(s) (LRB):  ESOPHAGOGASTRODUODENOSCOPY (EGD) (N/A)  COLONOSCOPY (N/A)    Anesthesia type: MAC    Post pain: Adequate analgesia    Post assessment: no apparent anesthetic complications, tolerated procedure well and no evidence of recall    Last Vitals:   Visit Vitals  BP (!) 160/83   Pulse 92   Temp 36 °C (96.8 °F) (Skin)   Resp 14   LMP 05/28/2017   SpO2 100%   Breastfeeding? No       Post vital signs: stable    Level of consciousness: awake, alert  and oriented    Nausea/Vomiting: no nausea/no vomiting    Complications: none    Airway Patency: patent    Respiratory: unassisted, spontaneous ventilation, room air    Cardiovascular: stable and blood pressure at baseline    Hydration: euvolemic

## 2017-06-01 NOTE — ANESTHESIA POSTPROCEDURE EVALUATION
Anesthesia Post Evaluation    Patient: Sandy Alfonso    Procedure(s) Performed: Procedure(s) (LRB):  ESOPHAGOGASTRODUODENOSCOPY (EGD) (N/A)  COLONOSCOPY (N/A)    Final Anesthesia Type: MAC  Patient location during evaluation: PACU  Patient participation: Yes- Able to Participate  Level of consciousness: awake and alert and oriented  Post-procedure vital signs: reviewed and stable  Pain management: adequate  Airway patency: patent  PONV status at discharge: No PONV  Anesthetic complications: no      Cardiovascular status: blood pressure returned to baseline, hemodynamically stable and stable  Respiratory status: unassisted, spontaneous ventilation and room air  Hydration status: euvolemic  Follow-up not needed.        Visit Vitals  BP (!) 160/83   Pulse 92   Temp 36 °C (96.8 °F) (Skin)   Resp 14   LMP 05/28/2017   SpO2 100%   Breastfeeding? No       Pain/Theresa Score: Pain Assessment Performed: Yes (6/1/2017 10:11 AM)  Presence of Pain: denies (6/1/2017 10:11 AM)

## 2017-06-01 NOTE — TRANSFER OF CARE
Anesthesia Transfer of Care Note    Patient: Sandy Alfonso    Procedure(s) Performed: Procedure(s) (LRB):  ESOPHAGOGASTRODUODENOSCOPY (EGD) (N/A)  COLONOSCOPY (N/A)    Patient location: PACU    Anesthesia Type: MAC    Transport from OR: Transported from OR on room air with adequate spontaneous ventilation    Post pain: adequate analgesia    Post assessment: no apparent anesthetic complications and tolerated procedure well    Post vital signs: stable    Level of consciousness: awake    Nausea/Vomiting: no nausea/vomiting    Complications: none    Transfer of care protocol was followed      Last vitals:   Visit Vitals  BP (!) 160/83   Pulse 92   Temp 36 °C (96.8 °F) (Skin)   Resp 14   LMP 05/28/2017   SpO2 100%   Breastfeeding? No

## 2017-06-01 NOTE — DISCHARGE INSTRUCTIONS
What Is Ham Esophagus?          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 This means that there have been changes to the lining of the esophagus near the stomach. The changes may have been caused by the acid reflux that happens with GERD (gastroesophageal reflux disease). The changed lining is not cancerous, but may increase your chances of developing cancer later on.      When you have GERD  The esophagus is the tube that carries food and liquid from the mouth to the stomach. Your lower esophageal sphincter (LES) is a one-way valve at the top of the stomach. It keeps food and stomach acid from flowing backward. If the LES is weakened, food and stomach acid flow back (reflux) into your esophagus. If this happens often, the condition is called GERD.  Changes in the lining  The stomach is kept safe from its own acid by a special lining. The esophagus isnt meant to contact stomach acid. With GERD, acid flows back into the esophagus often. This damages the esophagus. In response to the damage, new tissue forms that is not normal. This is Ham esophagus. The new tissue may keep changing. This is why it is more likely to become cancer in the future.  Preventing further damage  Your healthcare provider may suggest regular tests to keep track of changes in the esophagus. This usually includes an endoscopy, when a flexible lighted scope is placed through the mouth into the esophagus. Biopsies (tissue samples) can be taken of the abnormal areas. You are usually sedated with an IV medicine for comfort. He or she may also suggest ways for you to control GERD. This includes lifestyle changes, medicine, or even surgery. This should help keep your Ham esophagus from getting worse.  Symptoms of GERD  Symptoms include the following:  · Heartburn  · Sour-tasting fluid backing up into your mouth  · Frequent burping or belching  · Symptoms that get worse after you eat, bend over, or lie down  · Coughing repeatedly to clear your  throat  · Hoarseness   Date Last Reviewed: 6/1/2016  © 6185-4306 [employer]. [address] [place] [address], [place] [address]. All rights reserved. This information is not intended as a substitute for professional medical care. Always follow your healthcare professional's instructions.        Understanding Colon and Rectal Polyps    The colon (also called the large intestine) is a muscular tube that forms the last part of the digestive tract. It absorbs water and stores food waste. The colon is about 4 to 6 feet long. The rectum is the last 6 inches of the colon. The colon and rectum have a smooth lining composed of millions of cells. Changes in these cells can lead to growths in the colon that can become cancerous and should be removed. Multiple tests are available to screen for colon cancer, but the colonoscopy is the most recommended test. During colonoscopy, these polyps can be removed. How often you need this test depends on many things including your condition, your family history, symptoms, and what the findings were at the previous colonoscopy.   When the colon lining changes  Changes that happen in the cells that line the colon or rectum can lead to growths called polyps. Over a period of years, polyps can turn cancerous. Removing polyps early may prevent cancer from ever forming.  Polyps  Polyps are fleshy clumps of tissue that form on the lining of the colon or rectum. Small polyps are usually benign (not cancerous). However, over time, cells in a polyp can change and become cancerous. Certain types of polyps known as adenomatous polyps are premalignant. The risk for invasive cancer increases with the size of the polyp and certain cell and gene features. This means that they can become cancerous if they're not removed. Hyperplastic polyps are benign. They can grow quite large and not turn cancerous.   Cancer  Almost all colorectal cancers start when polyp cells begin growing abnormally. As a  cancerous tumor grows, it may involve more and more of the colon or rectum. In time, cancer can also grow beyond the colon or rectum and spread to nearby organs or to glands called lymph nodes. The cells can also travel to other parts of the body. This is known as metastasis. The earlier a cancerous tumor is removed, the better the chance of preventing its spread.    Date Last Reviewed: 8/1/2016  © 2475-2185 [employer]. [address] [place] [address], [place] [address]. All rights reserved. This information is not intended as a substitute for professional medical care. Always follow your healthcare professional's instructions.        Gastritis (Adult)    Gastritis is inflammation and irritation of the stomach lining. It can be present for a short time (acute) or be long lasting (chronic). Gastritis is often caused by infection with bacteria called H pylori. More than a third of people in the  have this bacteria in their bodies. In many cases, H pylori causes no problems or symptoms. In some people, though, the infection irritates the stomach lining and causes gastritis. Other causes of stomach irritation include drinking alcohol or taking pain-relieving medicines called NSAIDs (such as aspirin or ibuprofen).   Symptoms of gastritis can include:  · Abdominal pain or bloating  · Loss of appetite  · Nausea or vomiting  · Vomiting blood or having black stools  · Feeling more tired than usual  An inflamed and irritated stomach lining is more likely to develop a sore called an ulcer. To help prevent this, gastritis should be treated.  Home care  If needed, medicines may be prescribed. If you have H pylori infection, treating it will likely relieve your symptoms. Other changes can help reduce stomach irritation and help it heal.  · If you have been prescribed medicines for H pylori infection, take them as directed. Take all of the medicine until it is finished or your healthcare provider tells you to stop, even if  you feel better.  · Your healthcare provider may recommend avoiding NSAIDs. If you take daily aspirin for your heart or other medical reasons, do not stop without talking to your healthcare provider first.  · Avoid drinking alcohol.  · Stop smoking. Smoking can irritate the stomach and delay healing. As much as possible, stay away from second hand smoke.  Follow-up care  Follow up with your healthcare provider, or as advised by our staff. Testing may be needed to check for inflammation or an ulcer.  When to seek medical advice  Call your healthcare provider for any of the following:  · Stomach pain that gets worse or moves to the lower right abdomen (appendix area)  · Chest pain that appears or gets worse, or spreads to the back, neck, shoulder, or arm  · Frequent vomiting (cant keep down liquids)  · Blood in the stool or vomit (red or black in color)  · Feeling weak or dizzy  · Fever of 100.4ºF (38ºC) or higher, or as directed by your healthcare provider  Date Last Reviewed: 6/22/2015  © 6661-8566 [employer]. [address] [place] [address], [place] [address]. All rights reserved. This information is not intended as a substitute for professional medical care. Always follow your healthcare professional's instructions.

## 2017-06-01 NOTE — INTERVAL H&P NOTE
The patient has been examined and the H&P has been reviewed:    I concur with the findings and no changes have occurred since H&P was written.    Anesthesia/Surgery risks, benefits and alternative options discussed and understood by patient/family.          Active Hospital Problems    Diagnosis  POA    Early satiety [R68.81]  Yes      Resolved Hospital Problems    Diagnosis Date Resolved POA   No resolved problems to display.

## 2017-06-01 NOTE — ANESTHESIA PREPROCEDURE EVALUATION
06/01/2017  Sandy Alfonso is a 41 y.o., female.    Anesthesia Evaluation    I have reviewed the Patient Summary Reports.    I have reviewed the Nursing Notes.   I have reviewed the Medications.     Review of Systems  Anesthesia Hx:  No problems with previous Anesthesia  Denies Family Hx of Anesthesia complications.   Denies Personal Hx of Anesthesia complications.   Social:  Non-Smoker, Social Alcohol Use    Cardiovascular:   Exercise tolerance: good    Hepatic/GI:   Bowel Prep. Last bowel 0700   Neurological:  Neurology Normal    Endocrine:   Hypothyroidism        Physical Exam  General:  Well nourished    Airway/Jaw/Neck:  Airway Findings: Mouth Opening: Normal Tongue: Normal  General Airway Assessment: Adult  Mallampati: II  TM Distance: Normal, at least 6 cm       Chest/Lungs:  Chest/Lungs Findings: Clear to auscultation     Heart/Vascular:  Heart Findings: Rate: Normal  Rhythm: Frequent Prematures  Sounds: Normal             Anesthesia Plan  Type of Anesthesia, risks & benefits discussed:  Anesthesia Type:  MAC  Patient's Preference:   Intra-op Monitoring Plan:   Intra-op Monitoring Plan Comments:   Post Op Pain Control Plan:   Post Op Pain Control Plan Comments:   Induction:    Beta Blocker:  Patient is not currently on a Beta-Blocker (No further documentation required).       Informed Consent: Patient understands risks and agrees with Anesthesia plan.  Questions answered. Anesthesia consent signed with patient.  ASA Score: 1     Day of Surgery Review of History & Physical: I have interviewed and examined the patient. I have reviewed the patient's H&P dated:  There are no significant changes.          Ready For Surgery From Anesthesia Perspective.

## 2017-06-06 ENCOUNTER — PATIENT MESSAGE (OUTPATIENT)
Dept: GASTROENTEROLOGY | Facility: HOSPITAL | Age: 42
End: 2017-06-06

## 2017-06-06 RX ORDER — AMOXICILLIN 500 MG/1
1000 CAPSULE ORAL EVERY 12 HOURS
Qty: 56 CAPSULE | Refills: 0 | Status: SHIPPED | OUTPATIENT
Start: 2017-06-06 | End: 2017-06-20

## 2017-06-06 RX ORDER — PANTOPRAZOLE SODIUM 20 MG/1
20 TABLET, DELAYED RELEASE ORAL
Qty: 28 TABLET | Refills: 0 | Status: SHIPPED | OUTPATIENT
Start: 2017-06-06 | End: 2017-07-11 | Stop reason: SDUPTHER

## 2017-06-06 RX ORDER — METRONIDAZOLE 250 MG/1
250 TABLET ORAL 4 TIMES DAILY
Qty: 56 TABLET | Refills: 0 | Status: SHIPPED | OUTPATIENT
Start: 2017-06-06 | End: 2017-06-20

## 2017-06-06 RX ORDER — CLARITHROMYCIN 500 MG/1
500 TABLET, FILM COATED ORAL 2 TIMES DAILY
Qty: 28 TABLET | Refills: 0 | Status: SHIPPED | OUTPATIENT
Start: 2017-06-06 | End: 2017-06-20

## 2017-06-06 NOTE — TELEPHONE ENCOUNTER
Pathology report reveals single small hyperplastic polyp. Repeat colonoscopy in 10 years. Gastric bxs positive for H. Pylori. Rx for amox/clarith/flagyl/PPI sent to pharmacy for 14d.    Results sent to patient through [facility].

## 2017-06-07 ENCOUNTER — PATIENT MESSAGE (OUTPATIENT)
Dept: GASTROENTEROLOGY | Facility: HOSPITAL | Age: 42
End: 2017-06-07

## 2017-06-09 ENCOUNTER — PATIENT MESSAGE (OUTPATIENT)
Dept: INTERNAL MEDICINE | Facility: CLINIC | Age: 42
End: 2017-06-09

## 2017-06-10 RX ORDER — LEVOTHYROXINE SODIUM 75 UG/1
75 TABLET ORAL DAILY
Qty: 90 TABLET | Refills: 0 | Status: SHIPPED | OUTPATIENT
Start: 2017-06-10 | End: 2018-06-10

## 2017-06-20 ENCOUNTER — PATIENT MESSAGE (OUTPATIENT)
Dept: GASTROENTEROLOGY | Facility: CLINIC | Age: 42
End: 2017-06-20

## 2017-07-11 RX ORDER — PANTOPRAZOLE SODIUM 20 MG/1
20 TABLET, DELAYED RELEASE ORAL
Qty: 28 TABLET | Refills: 0 | Status: SHIPPED | OUTPATIENT
Start: 2017-07-11 | End: 2017-07-25

## 2017-09-22 ENCOUNTER — PATIENT MESSAGE (OUTPATIENT)
Dept: INTERNAL MEDICINE | Facility: CLINIC | Age: 42
End: 2017-09-22

## 2020-01-21 NOTE — TELEPHONE ENCOUNTER
----- Message from Parul Odonnell sent at 5/23/2017  4:37 PM CDT -----  Contact: pt  Pt request call back no additional info given, pt can be reached at [phone]///[email or web address]   Body Location Override (Optional - Billing Will Still Be Based On Selected Body Map Location If Applicable): nasal tip Detail Level: Detailed Add 80893 Cpt? (Important Note: In 2017 The Use Of 08852 Is Being Tracked By Cms To Determine Future Global Period Reimbursement For Global Periods): no

## 2021-05-14 ENCOUNTER — TELEPHONE (OUTPATIENT)
Dept: RHEUMATOLOGY | Facility: CLINIC | Age: 46
End: 2021-05-14

## 2021-05-17 ENCOUNTER — TELEPHONE (OUTPATIENT)
Dept: RHEUMATOLOGY | Facility: CLINIC | Age: 46
End: 2021-05-17

## 2021-05-25 ENCOUNTER — TELEPHONE (OUTPATIENT)
Dept: RHEUMATOLOGY | Facility: CLINIC | Age: 46
End: 2021-05-25